# Patient Record
Sex: MALE | Race: WHITE
[De-identification: names, ages, dates, MRNs, and addresses within clinical notes are randomized per-mention and may not be internally consistent; named-entity substitution may affect disease eponyms.]

---

## 2019-02-16 ENCOUNTER — HOSPITAL ENCOUNTER (EMERGENCY)
Dept: HOSPITAL 74 - JER | Age: 49
Discharge: HOME | End: 2019-02-16
Payer: COMMERCIAL

## 2019-02-16 VITALS — BODY MASS INDEX: 28.7 KG/M2

## 2019-02-16 VITALS — DIASTOLIC BLOOD PRESSURE: 83 MMHG | TEMPERATURE: 98.7 F | SYSTOLIC BLOOD PRESSURE: 133 MMHG | HEART RATE: 94 BPM

## 2019-02-16 DIAGNOSIS — F32.9: ICD-10-CM

## 2019-02-16 DIAGNOSIS — F41.8: ICD-10-CM

## 2019-02-16 DIAGNOSIS — I10: ICD-10-CM

## 2019-02-16 DIAGNOSIS — R10.13: Primary | ICD-10-CM

## 2019-02-16 LAB
ALBUMIN SERPL-MCNC: 4.1 G/DL (ref 3.4–5)
ALP SERPL-CCNC: 96 U/L (ref 45–117)
ALT SERPL-CCNC: 48 U/L (ref 13–61)
ANION GAP SERPL CALC-SCNC: 6 MMOL/L (ref 8–16)
AST SERPL-CCNC: 25 U/L (ref 15–37)
BASOPHILS # BLD: 0.7 % (ref 0–2)
BILIRUB SERPL-MCNC: 0.4 MG/DL (ref 0.2–1)
BUN SERPL-MCNC: 14 MG/DL (ref 7–18)
CALCIUM SERPL-MCNC: 9.4 MG/DL (ref 8.5–10.1)
CHLORIDE SERPL-SCNC: 101 MMOL/L (ref 98–107)
CO2 SERPL-SCNC: 30 MMOL/L (ref 21–32)
CREAT SERPL-MCNC: 0.9 MG/DL (ref 0.55–1.3)
DEPRECATED RDW RBC AUTO: 13 % (ref 11.9–15.9)
EOSINOPHIL # BLD: 1.4 % (ref 0–4.5)
GLUCOSE SERPL-MCNC: 116 MG/DL (ref 74–106)
HCT VFR BLD CALC: 43.6 % (ref 35.4–49)
HGB BLD-MCNC: 15.2 GM/DL (ref 11.7–16.9)
LYMPHOCYTES # BLD: 30.1 % (ref 8–40)
MCH RBC QN AUTO: 31.9 PG (ref 25.7–33.7)
MCHC RBC AUTO-ENTMCNC: 34.8 G/DL (ref 32–35.9)
MCV RBC: 91.6 FL (ref 80–96)
MONOCYTES # BLD AUTO: 10.8 % (ref 3.8–10.2)
NEUTROPHILS # BLD: 57 % (ref 42.8–82.8)
PLATELET # BLD AUTO: 290 K/MM3 (ref 134–434)
PMV BLD: 8.1 FL (ref 7.5–11.1)
POTASSIUM SERPLBLD-SCNC: 4.8 MMOL/L (ref 3.5–5.1)
PROT SERPL-MCNC: 7.5 G/DL (ref 6.4–8.2)
RBC # BLD AUTO: 4.76 M/MM3 (ref 4–5.6)
SODIUM SERPL-SCNC: 137 MMOL/L (ref 136–145)
WBC # BLD AUTO: 6.6 K/MM3 (ref 4–10)

## 2019-02-16 PROCEDURE — 3E033GC INTRODUCTION OF OTHER THERAPEUTIC SUBSTANCE INTO PERIPHERAL VEIN, PERCUTANEOUS APPROACH: ICD-10-PCS | Performed by: EMERGENCY MEDICINE

## 2019-02-16 NOTE — EKG
Test Reason : 

Blood Pressure : ***/*** mmHG

Vent. Rate : 067 BPM     Atrial Rate : 067 BPM

   P-R Int : 142 ms          QRS Dur : 090 ms

    QT Int : 430 ms       P-R-T Axes : 059 074 069 degrees

   QTc Int : 454 ms

 

NORMAL SINUS RHYTHM

NORMAL ECG

NO PREVIOUS ECGS AVAILABLE

Confirmed by ALBINO LINDSEY MD ([id number]) on 2/16/2019 12:50:35 PM

 

Referred By:             Confirmed By:ALBINO LINDSEY MD

## 2019-02-16 NOTE — PDOC
History of Present Illness





- General


Chief Complaint: Pain


Stated Complaint: ABDOMINAL PAIN


Time Seen by Provider: 02/16/19 07:45


History Source: Patient


Exam Limitations: No Limitations





Past History





- Past Medical History


Allergies/Adverse Reactions: 


 Allergies











Allergy/AdvReac Type Severity Reaction Status Date / Time


 


No Known Allergies Allergy   Verified 02/16/19 07:20











Home Medications: 


Ambulatory Orders





Escitalopram Oxalate [Lexapro -] 20 mg PO DAILY 02/16/19 


Methylphenidate HCl [Ritalin LA] 20 mg PO DAILY 02/16/19 


Valsartan [Diovan] 80 mg PO DAILY 02/16/19 








COPD: No


HTN: Yes


Psychiatric Problems: Yes (anxiety)





- Suicide/Smoking/Psychosocial Hx


Smoking History: Current some day smoker


Number of Cigarettes Smoked Daily: 1


Information on smoking cessation initiated: No





*Physical Exam





- Vital Signs


 Last Vital Signs











Temp Pulse Resp BP Pulse Ox


 


 98.7 F   94 H  18   133/83   99 


 


 02/16/19 07:17  02/16/19 07:17  02/16/19 07:17  02/16/19 07:17  02/16/19 07:17














- Physical Exam


General Appearance: No: Apparent Distress


Respiratory/Chest: positive: Lungs Clear, Normal Breath Sounds.  negative: 

Respiratory Distress


Cardiovascular: positive: Regular Rhythm, Regular Rate, S1, S2.  negative: 

Murmur


Gastrointestinal/Abdominal: positive: Normal Bowel Sounds, Soft.  negative: 

Tender, Distended, Guarding, Rebound


Musculoskeletal: negative: CVA Tenderness


Integumentary: positive: Normal Color


Neurologic: positive: Fully Oriented, Alert, Normal Mood/Affect





Moderate Sedation





- Procedure Monitoring


Vital Signs: 


Procedure Monitoring Vital Signs











Temperature  98.7 F   02/16/19 07:17


 


Pulse Rate  94 H  02/16/19 07:17


 


Respiratory Rate  18   02/16/19 07:17


 


Blood Pressure  133/83   02/16/19 07:17


 


O2 Sat by Pulse Oximetry (%)  99   02/16/19 07:17











**Heart Score/ECG Review


  ** #1


 NSR at 66 bpm, no ST-T changes


02/16/19 08:27








ED Treatment Course





- LABORATORY


CBC & Chemistry Diagram: 


 02/16/19 08:31





 02/16/19 08:31





- RADIOLOGY


Radiology Studies Ordered: 














 Category Date Time Status


 


 CHEST PA & LAT [RAD] Stat Radiology  02/16/19 08:01 Ordered














Medical Decision Making





- Medical Decision Making


  





47 y/o M with hx of HTN, anxiety, depression presents with nonradiating 

epigastric pain, occasional nausea and bloating x 4 days. Had few episodes of 

loose stools yesterday, but not having currently. Has tried taking Advil, 

Mylanta, Simethicone without relief of pain. Also mentions having some dull/

achy substernal CP today around 4 hours ago. Denies fever, cough, SOB, vomiting

, urinary complaints, dizziness. Currently smokes 3-4 cigs/week. Denies drug 

use. 





Epigastric pain; consider gastritis, ACS [less suspicious for cholecystitis, 

pancreatitis]


Plan:


Labs, EKG, CXR, trial of Pepcid, reassess





02/16/19 08:01





EKG nonischemic


Initial labs unremarkable


Patient resting comfortably, mentions feeling a bit better





Will repeat trop and EKG at 11





02/16/19 10:03





Repeat EKG shows NSR at 67 bpm, no ischemic changes


Repeat trop neg


Stable for dc





02/16/19 13:01








*DC/Admit/Observation/Transfer


Diagnosis at time of Disposition: 


 Epigastric pain








- Discharge Dispostion


Disposition: HOME


Condition at time of disposition: Stable


Decision to Admit order: No





- Referrals


Referrals: 


Minesh Moseley MD [Primary Care Provider] - 2 Days





- Patient Instructions


Printed Discharge Instructions:  DI for Epigastric Pain


Additional Instructions: 





Thank you for choosing [facility].  It was a pleasure taking 

care of you.  





Your blood work here was unremarkable


Recommend following up with your PCP in 2-3 day and also consider following up 

with GI doctor.





Return to the Emergency Department if your symptoms worsen or persist, you have 

fever, shortness of breath, chest pain, severe abdominal pain, vomiting, 

diarrhea, black or bloody stools or other concerning symptoms. 





- Post Discharge Activity